# Patient Record
(demographics unavailable — no encounter records)

---

## 2025-05-07 NOTE — DISCUSSION/SUMMARY
[de-identified] : Assessment: Left ankle Sprain/Injury.  Plan: #1. ASO brace given to support his ankle.  Wear as instructed for two weeks.  Weightbearing as tolerated. #2. Antiinflammatories/Tylenol as needed, OTC. #3. RICE therapy #4. Limit activities and use of ankle for 2 weeks.  No gym and no sports and a note was given for school. #5. All questions answered.  Patient will follow-up in office in 2 weeks.  If symptoms worsen or patient has concerns, they may call office and/or come into office sooner if needed.

## 2025-05-07 NOTE — HISTORY OF PRESENT ILLNESS
[FreeTextEntry1] : The patient is a 15-year-old male who presents with acute left ankle injury sustained while playing basketball 5/4/2025, rolled his ankle.  He went to the urgent care on that day, x-rays were taken which were negative for fractures.  The patient does present with soft tissue swelling to his left ankle.  He does state that his pain has slightly improved since the day of the injury.  He presents wearing an Aircast given to him by the urgent care.  He presents with his mom and dad in the office today.  No other complaints.

## 2025-05-07 NOTE — PHYSICAL EXAM
[de-identified] : Left ankle Physical Examination:  General: Alert and oriented x3.  In no acute distress.  Pleasant in nature with a normal affect.  No apparent respiratory distress.  Erythema, Warmth, Rubor: Negative Swelling: Positive swelling and slight bruising present to the lateral ankle.  ROM: 1. Dorsiflexion: 10 degrees 2. Plantarflexion: 40 degrees 3. Inversion: 30 degrees 4. Eversion: 20 degrees 5. Subtalar: 10 degrees  Tenderness to Palpation:  1. Lateral Malleolus: Negative 2. Medial Malleolus: Negative 3. Proximal Fibular Pain: Negative 4. Heel Pain: Negative 5. Cuboid: Negative 6. Navicular: Negative 7. Tibiotalar Joint: Negative 8. Subtalar Joint: Negative 9. Posterior Recess: Negative  Tendon Pain: 1. Achilles: Negative 2. Peroneals: Negative 3. Posterior Tibialis: Negative 4. Tibialis Anterior: Negative  Ligament Pain: 1. ATFL: Positive 2. CFL: Positive 3. PTFL: Negative 4. Deltoid Ligaments: Negative 5. Lis Franc Ligament: Negative  Stability:  1. Anterior Drawer: Negative 2. Posterior Drawer: Negative  Strength: 5/5 TA/GS/EHL  Pulses: 2+ DP/PT Pulses  Neuro: Intact motor and sensory  Additional Test: 1. Calcaneal Squeeze Test: Negative 2. Syndesmosis Squeeze Test: Negative [de-identified] : Radiology imaging reviewed in office with the patient on 5/7/2025 and I agree with the radiologist's impression below.   Procedure was performed at the [facility]  EXAM: ANKLE LT 3 VIEWS   PROCEDURE DATE: 05/04/2025   INTERPRETATION: Clinical indications: Left ankle pain.  3 views the left ankle without comparison.  IMPRESSION: There is soft tissue swelling laterally. There is no acute fracture or dislocation. There is a small ankle joint effusion.  --- End of Report ---       GEMINI SEHIKH MD; Attending Radiologist This document has been electronically signed. May 4 2025 10:06AM